# Patient Record
Sex: MALE | Race: WHITE | Employment: FULL TIME | URBAN - METROPOLITAN AREA
[De-identification: names, ages, dates, MRNs, and addresses within clinical notes are randomized per-mention and may not be internally consistent; named-entity substitution may affect disease eponyms.]

---

## 2017-01-11 ENCOUNTER — APPOINTMENT (OUTPATIENT)
Dept: LAB | Age: 52
End: 2017-01-11
Attending: INTERNAL MEDICINE

## 2017-01-11 DIAGNOSIS — K63.5 POLYP OF COLON, UNSPECIFIED PART OF COLON, UNSPECIFIED TYPE: ICD-10-CM

## 2017-01-11 PROCEDURE — 88305 TISSUE EXAM BY PATHOLOGIST: CPT

## 2017-01-18 PROBLEM — G57.52 TARSAL TUNNEL SYNDROME, LEFT: Status: ACTIVE | Noted: 2017-01-18

## 2017-01-18 PROBLEM — M24.072 LOOSE BODY IN LEFT ANKLE: Status: ACTIVE | Noted: 2017-01-18

## 2017-01-23 NOTE — PROGRESS NOTES
Quick Note:    1/23/2017  Karl Gonzalez [address] [place] [address]    Dear Ethel Weeks,       Here are the biopsy/pathology findings from your recent Colonoscopy :    an adenomatous polyp(s), which is a benign potentially pre-cancerous growth

## 2017-05-05 PROCEDURE — 84153 ASSAY OF PSA TOTAL: CPT | Performed by: UROLOGY

## 2017-05-05 PROCEDURE — 36415 COLL VENOUS BLD VENIPUNCTURE: CPT | Performed by: UROLOGY

## 2018-01-17 PROBLEM — N52.01 ERECTILE DYSFUNCTION DUE TO ARTERIAL INSUFFICIENCY: Status: ACTIVE | Noted: 2018-01-17

## 2019-07-09 PROBLEM — R10.31 RIGHT LOWER QUADRANT ABDOMINAL PAIN: Status: ACTIVE | Noted: 2019-06-22

## 2019-07-09 PROBLEM — K35.30 ACUTE APPENDICITIS WITH LOCALIZED PERITONITIS, WITHOUT PERFORATION, ABSCESS, OR GANGRENE: Status: ACTIVE | Noted: 2019-06-22

## 2019-07-09 PROBLEM — R11.0 NAUSEA: Status: ACTIVE | Noted: 2019-06-22

## 2020-01-16 PROBLEM — R53.82 CHRONIC FATIGUE: Status: ACTIVE | Noted: 2020-01-16

## 2020-01-16 PROBLEM — E06.3 HASHIMOTO'S THYROIDITIS: Status: ACTIVE | Noted: 2020-01-16

## 2020-01-16 PROBLEM — R21 RASH: Status: ACTIVE | Noted: 2020-01-16

## 2020-01-16 PROBLEM — R68.82 DECREASED LIBIDO: Status: ACTIVE | Noted: 2020-01-16

## 2020-02-10 PROBLEM — E55.9 VITAMIN D DEFICIENCY: Status: ACTIVE | Noted: 2020-02-10

## 2020-02-10 PROBLEM — E29.1 HYPOGONADISM IN MALE: Status: ACTIVE | Noted: 2020-02-10

## 2020-05-11 PROBLEM — R60.0 LEG EDEMA, RIGHT: Status: ACTIVE | Noted: 2020-05-11

## 2020-06-18 PROCEDURE — 88305 TISSUE EXAM BY PATHOLOGIST: CPT | Performed by: INTERNAL MEDICINE

## 2021-02-11 PROBLEM — R60.0 PEDAL EDEMA: Status: ACTIVE | Noted: 2021-02-11

## (undated) NOTE — Clinical Note
1/23/2017          Nila Osman [address] Srinivasan [address] [place] [address]    Dear Karin Sites,       Here are the  biopsy/pathology findings from your recent Colonoscopy :    an adenomatous polyp(s), which is a benign potentially pre-cancerous growth th